# Patient Record
Sex: MALE | Race: WHITE | NOT HISPANIC OR LATINO | Employment: OTHER | URBAN - METROPOLITAN AREA
[De-identification: names, ages, dates, MRNs, and addresses within clinical notes are randomized per-mention and may not be internally consistent; named-entity substitution may affect disease eponyms.]

---

## 2021-06-01 ENCOUNTER — RECORDS - HEALTHEAST (OUTPATIENT)
Dept: ADMINISTRATIVE | Facility: CLINIC | Age: 66
End: 2021-06-01

## 2022-07-02 ENCOUNTER — APPOINTMENT (OUTPATIENT)
Dept: GENERAL RADIOLOGY | Facility: CLINIC | Age: 67
End: 2022-07-02
Attending: FAMILY MEDICINE
Payer: MEDICARE

## 2022-07-02 ENCOUNTER — APPOINTMENT (OUTPATIENT)
Dept: CT IMAGING | Facility: CLINIC | Age: 67
End: 2022-07-02
Attending: FAMILY MEDICINE
Payer: MEDICARE

## 2022-07-02 ENCOUNTER — HOSPITAL ENCOUNTER (EMERGENCY)
Facility: CLINIC | Age: 67
Discharge: HOME | End: 2022-07-02
Attending: FAMILY MEDICINE | Admitting: FAMILY MEDICINE
Payer: MEDICARE

## 2022-07-02 VITALS
RESPIRATION RATE: 20 BRPM | BODY MASS INDEX: 32.45 KG/M2 | TEMPERATURE: 98.3 F | OXYGEN SATURATION: 94 % | DIASTOLIC BLOOD PRESSURE: 63 MMHG | WEIGHT: 195 LBS | SYSTOLIC BLOOD PRESSURE: 124 MMHG | HEART RATE: 68 BPM

## 2022-07-02 DIAGNOSIS — K52.9 GASTROENTERITIS: ICD-10-CM

## 2022-07-02 DIAGNOSIS — I10 HYPERTENSION GOAL BP (BLOOD PRESSURE) < 140/90: ICD-10-CM

## 2022-07-02 DIAGNOSIS — I71.40 ABDOMINAL AORTIC ANEURYSM (AAA) WITHOUT RUPTURE (H): ICD-10-CM

## 2022-07-02 DIAGNOSIS — I72.3 ILIAC ARTERY ANEURYSM, LEFT (H): ICD-10-CM

## 2022-07-02 DIAGNOSIS — R59.1 LYMPHADENOPATHY: ICD-10-CM

## 2022-07-02 LAB
ALBUMIN SERPL-MCNC: 4.2 G/DL (ref 3.4–5)
ALBUMIN UR-MCNC: 100 MG/DL
ALP SERPL-CCNC: 55 U/L (ref 40–150)
ALT SERPL W P-5'-P-CCNC: 38 U/L (ref 0–70)
ANION GAP SERPL CALCULATED.3IONS-SCNC: 6 MMOL/L (ref 3–14)
APPEARANCE UR: CLEAR
AST SERPL W P-5'-P-CCNC: 27 U/L (ref 0–45)
BASOPHILS # BLD AUTO: 0.1 10E3/UL (ref 0–0.2)
BASOPHILS NFR BLD AUTO: 1 %
BILIRUB SERPL-MCNC: 0.5 MG/DL (ref 0.2–1.3)
BILIRUB UR QL STRIP: NEGATIVE
BUN SERPL-MCNC: 22 MG/DL (ref 7–30)
CALCIUM SERPL-MCNC: 10.1 MG/DL (ref 8.5–10.1)
CHLORIDE BLD-SCNC: 102 MMOL/L (ref 94–109)
CO2 SERPL-SCNC: 29 MMOL/L (ref 20–32)
COLOR UR AUTO: YELLOW
CREAT SERPL-MCNC: 0.94 MG/DL (ref 0.66–1.25)
EOSINOPHIL # BLD AUTO: 0 10E3/UL (ref 0–0.7)
EOSINOPHIL NFR BLD AUTO: 0 %
ERYTHROCYTE [DISTWIDTH] IN BLOOD BY AUTOMATED COUNT: 13.4 % (ref 10–15)
FLUAV RNA SPEC QL NAA+PROBE: NEGATIVE
FLUBV RNA RESP QL NAA+PROBE: NEGATIVE
GFR SERPL CREATININE-BSD FRML MDRD: 90 ML/MIN/1.73M2
GLUCOSE BLD-MCNC: 156 MG/DL (ref 70–99)
GLUCOSE UR STRIP-MCNC: NEGATIVE MG/DL
HCT VFR BLD AUTO: 53.2 % (ref 40–53)
HGB BLD-MCNC: 18 G/DL (ref 13.3–17.7)
HGB UR QL STRIP: ABNORMAL
HOLD SPECIMEN: NORMAL
IMM GRANULOCYTES # BLD: 0.1 10E3/UL
IMM GRANULOCYTES NFR BLD: 1 %
KETONES UR STRIP-MCNC: NEGATIVE MG/DL
LEUKOCYTE ESTERASE UR QL STRIP: NEGATIVE
LYMPHOCYTES # BLD AUTO: 1.8 10E3/UL (ref 0.8–5.3)
LYMPHOCYTES NFR BLD AUTO: 14 %
MCH RBC QN AUTO: 30.4 PG (ref 26.5–33)
MCHC RBC AUTO-ENTMCNC: 33.8 G/DL (ref 31.5–36.5)
MCV RBC AUTO: 90 FL (ref 78–100)
MONOCYTES # BLD AUTO: 0.5 10E3/UL (ref 0–1.3)
MONOCYTES NFR BLD AUTO: 4 %
MUCOUS THREADS #/AREA URNS LPF: PRESENT /LPF
NEUTROPHILS # BLD AUTO: 10.4 10E3/UL (ref 1.6–8.3)
NEUTROPHILS NFR BLD AUTO: 80 %
NITRATE UR QL: NEGATIVE
NRBC # BLD AUTO: 0 10E3/UL
NRBC BLD AUTO-RTO: 0 /100
PH UR STRIP: 6 [PH] (ref 5–7)
PLATELET # BLD AUTO: 361 10E3/UL (ref 150–450)
POTASSIUM BLD-SCNC: 3.7 MMOL/L (ref 3.4–5.3)
PROT SERPL-MCNC: 8.3 G/DL (ref 6.8–8.8)
RADIOLOGIST FLAGS: ABNORMAL
RBC # BLD AUTO: 5.93 10E6/UL (ref 4.4–5.9)
RBC URINE: >182 /HPF
SARS-COV-2 RNA RESP QL NAA+PROBE: NEGATIVE
SODIUM SERPL-SCNC: 137 MMOL/L (ref 133–144)
SP GR UR STRIP: 1.02 (ref 1–1.03)
UROBILINOGEN UR STRIP-MCNC: NORMAL MG/DL
WBC # BLD AUTO: 13 10E3/UL (ref 4–11)
WBC URINE: 2 /HPF

## 2022-07-02 PROCEDURE — 99285 EMERGENCY DEPT VISIT HI MDM: CPT | Mod: 25 | Performed by: FAMILY MEDICINE

## 2022-07-02 PROCEDURE — 85025 COMPLETE CBC W/AUTO DIFF WBC: CPT | Performed by: FAMILY MEDICINE

## 2022-07-02 PROCEDURE — 96374 THER/PROPH/DIAG INJ IV PUSH: CPT | Mod: 59 | Performed by: FAMILY MEDICINE

## 2022-07-02 PROCEDURE — 99285 EMERGENCY DEPT VISIT HI MDM: CPT | Performed by: FAMILY MEDICINE

## 2022-07-02 PROCEDURE — C9803 HOPD COVID-19 SPEC COLLECT: HCPCS | Performed by: FAMILY MEDICINE

## 2022-07-02 PROCEDURE — 71045 X-RAY EXAM CHEST 1 VIEW: CPT

## 2022-07-02 PROCEDURE — 80053 COMPREHEN METABOLIC PANEL: CPT | Performed by: FAMILY MEDICINE

## 2022-07-02 PROCEDURE — G1010 CDSM STANSON: HCPCS

## 2022-07-02 PROCEDURE — 81001 URINALYSIS AUTO W/SCOPE: CPT | Performed by: FAMILY MEDICINE

## 2022-07-02 PROCEDURE — 250N000011 HC RX IP 250 OP 636: Performed by: FAMILY MEDICINE

## 2022-07-02 PROCEDURE — 250N000009 HC RX 250: Performed by: FAMILY MEDICINE

## 2022-07-02 PROCEDURE — 87636 SARSCOV2 & INF A&B AMP PRB: CPT | Performed by: FAMILY MEDICINE

## 2022-07-02 PROCEDURE — 36415 COLL VENOUS BLD VENIPUNCTURE: CPT | Performed by: EMERGENCY MEDICINE

## 2022-07-02 PROCEDURE — 258N000003 HC RX IP 258 OP 636: Performed by: FAMILY MEDICINE

## 2022-07-02 PROCEDURE — 96361 HYDRATE IV INFUSION ADD-ON: CPT | Performed by: FAMILY MEDICINE

## 2022-07-02 RX ORDER — ONDANSETRON 4 MG/1
4 TABLET, ORALLY DISINTEGRATING ORAL EVERY 8 HOURS PRN
Qty: 10 TABLET | Refills: 0 | Status: SHIPPED | OUTPATIENT
Start: 2022-07-02 | End: 2022-08-03

## 2022-07-02 RX ORDER — AMLODIPINE BESYLATE 5 MG/1
5 TABLET ORAL DAILY
Qty: 30 TABLET | Refills: 0 | Status: SHIPPED | OUTPATIENT
Start: 2022-07-02

## 2022-07-02 RX ORDER — IOPAMIDOL 755 MG/ML
86 INJECTION, SOLUTION INTRAVASCULAR ONCE
Status: COMPLETED | OUTPATIENT
Start: 2022-07-02 | End: 2022-07-02

## 2022-07-02 RX ORDER — SODIUM CHLORIDE 9 MG/ML
1000 INJECTION, SOLUTION INTRAVENOUS CONTINUOUS
Status: DISCONTINUED | OUTPATIENT
Start: 2022-07-02 | End: 2022-07-02 | Stop reason: HOSPADM

## 2022-07-02 RX ORDER — ONDANSETRON 2 MG/ML
4 INJECTION INTRAMUSCULAR; INTRAVENOUS ONCE
Status: COMPLETED | OUTPATIENT
Start: 2022-07-02 | End: 2022-07-02

## 2022-07-02 RX ADMIN — IOPAMIDOL 86 ML: 755 INJECTION, SOLUTION INTRAVENOUS at 16:45

## 2022-07-02 RX ADMIN — SODIUM CHLORIDE 64 ML: 9 INJECTION, SOLUTION INTRAVENOUS at 16:44

## 2022-07-02 RX ADMIN — SODIUM CHLORIDE 1000 ML: 9 INJECTION, SOLUTION INTRAVENOUS at 15:03

## 2022-07-02 RX ADMIN — ONDANSETRON 4 MG: 2 INJECTION INTRAMUSCULAR; INTRAVENOUS at 15:04

## 2022-07-02 ASSESSMENT — ENCOUNTER SYMPTOMS
PALPITATIONS: 0
WHEEZING: 0
DIAPHORESIS: 1
FREQUENCY: 0
NAUSEA: 0
SORE THROAT: 0
BLOOD IN STOOL: 0
VOMITING: 0
FEVER: 1
ABDOMINAL PAIN: 0
SINUS PRESSURE: 0
DIARRHEA: 0
CHILLS: 1
HEADACHES: 0
COUGH: 1
DYSURIA: 0
CONSTIPATION: 0
SHORTNESS OF BREATH: 0

## 2022-07-02 NOTE — ED NOTES
I asked the Family, I believe two different son's If one or the other could stay with father, I explained that reji. With covid times that we can't have multiple family member's coming in and out of room and dept every few min. The son that stayed through the rest of visit seemed upset by my telling him the information. I told him I was sorry and that was policy. He stated people out front told him different, I told him they were incorrect.

## 2022-07-02 NOTE — ED TRIAGE NOTES
Vomiting, diarrhea, chills, cramping pain off and on     Triage Assessment     Row Name 07/02/22 6348       Triage Assessment (Adult)    Airway WDL WDL       Respiratory WDL    Respiratory WDL WDL       Skin Circulation/Temperature WDL    Skin Circulation/Temperature WDL WDL       Peripheral/Neurovascular WDL    Peripheral Neurovascular WDL WDL       Cognitive/Neuro/Behavioral WDL    Cognitive/Neuro/Behavioral WDL WDL

## 2022-07-02 NOTE — ED PROVIDER NOTES
History     Chief Complaint   Patient presents with     Vomiting     Fever     HPI  Nguyễn Salazar is a 66 year old male who presents of onset on Thursday of vomiting diarrhea chills some abdominal cramping.  Possible fever.  No dysuria urgency frequency.  Some history of inguinal pain since he had a hernia about a year ago.  Cough chronically.  No significant upper respiratory congestion.  Feels overall ill.  Decreased p.o. intake recently.      Allergies:  No Known Allergies    Problem List:    Patient Active Problem List    Diagnosis Date Noted     Mood disorder in conditions classified elsewhere 04/13/2015     Priority: Medium     Hyperlipidemia 01/04/2013     Priority: Medium     Lumbago 03/26/2012     Priority: Medium        Past Medical History:    No past medical history on file.    Past Surgical History:    No past surgical history on file.    Family History:    No family history on file.    Social History:  Marital Status:   [5]        Medications:    No current outpatient medications on file.        Review of Systems   Constitutional: Positive for chills, diaphoresis and fever.   HENT: Negative for ear pain, sinus pressure and sore throat.    Eyes: Negative for visual disturbance.   Respiratory: Positive for cough. Negative for shortness of breath and wheezing.    Cardiovascular: Negative for chest pain and palpitations.   Gastrointestinal: Negative for abdominal pain, blood in stool, constipation, diarrhea, nausea and vomiting.   Genitourinary: Negative for dysuria, frequency and urgency.   Skin: Negative for rash.   Neurological: Negative for headaches.   All other systems reviewed and are negative.      Physical Exam   BP: (!) 220/112  Pulse: 56  Temp: 98.3  F (36.8  C)  Resp: 20  Weight: 88.5 kg (195 lb)  SpO2: 97 %      Physical Exam  Constitutional:       General: He is in acute distress.      Appearance: He is not diaphoretic.   HENT:      Head: Atraumatic.   Eyes:       Conjunctiva/sclera: Conjunctivae normal.   Cardiovascular:      Rate and Rhythm: Normal rate and regular rhythm.      Heart sounds: No murmur heard.  Pulmonary:      Effort: Pulmonary effort is normal. No respiratory distress.      Breath sounds: Normal breath sounds. No stridor. No wheezing or rhonchi.   Abdominal:      General: Abdomen is flat. There is no distension.      Palpations: Abdomen is soft. There is no mass.      Tenderness: There is abdominal tenderness. There is no guarding.   Musculoskeletal:      Cervical back: Neck supple.      Right lower leg: No edema.      Left lower leg: No edema.   Skin:     Coloration: Skin is not pale.      Findings: No rash.   Neurological:      Mental Status: He is alert.      Motor: No weakness.         ED Course                 Procedures              Critical Care time:  none               Results for orders placed or performed during the hospital encounter of 07/02/22 (from the past 24 hour(s))   [place] Draw    Narrative    The following orders were created for panel order [place] Draw.  Procedure                               Abnormality         Status                     ---------                               -----------         ------                     Extra Blue Top Tube[[id number]]                              Final result               Extra Red Top Tube[[id number]]                               Final result               Extra Green Top (Lithium...[[id number]]                      Final result               Extra Purple Top Tube[[id number]]                            Final result                 Please view results for these tests on the individual orders.   Extra Blue Top Tube   Result Value Ref Range    Hold Specimen JIC    Extra Red Top Tube   Result Value Ref Range    Hold Specimen JIC    Extra Green Top (Lithium Heparin) Tube   Result Value Ref Range    Hold Specimen JIC    Extra Purple Top Tube   Result Value Ref Range    Hold Specimen JIC    CBC with  platelets differential    Narrative    The following orders were created for panel order CBC with platelets differential.  Procedure                               Abnormality         Status                     ---------                               -----------         ------                     CBC with platelets and d...[[id number]]  Abnormal            Final result                 Please view results for these tests on the individual orders.   Comprehensive metabolic panel   Result Value Ref Range    Sodium 137 133 - 144 mmol/L    Potassium 3.7 3.4 - 5.3 mmol/L    Chloride 102 94 - 109 mmol/L    Carbon Dioxide (CO2) 29 20 - 32 mmol/L    Anion Gap 6 3 - 14 mmol/L    Urea Nitrogen 22 7 - 30 mg/dL    Creatinine 0.94 0.66 - 1.25 mg/dL    Calcium 10.1 8.5 - 10.1 mg/dL    Glucose 156 (H) 70 - 99 mg/dL    Alkaline Phosphatase 55 40 - 150 U/L    AST 27 0 - 45 U/L    ALT 38 0 - 70 U/L    Protein Total 8.3 6.8 - 8.8 g/dL    Albumin 4.2 3.4 - 5.0 g/dL    Bilirubin Total 0.5 0.2 - 1.3 mg/dL    GFR Estimate 90 >60 mL/min/1.73m2   CBC with platelets and differential   Result Value Ref Range    WBC Count 13.0 (H) 4.0 - 11.0 10e3/uL    RBC Count 5.93 (H) 4.40 - 5.90 10e6/uL    Hemoglobin 18.0 (H) 13.3 - 17.7 g/dL    Hematocrit 53.2 (H) 40.0 - 53.0 %    MCV 90 78 - 100 fL    MCH 30.4 26.5 - 33.0 pg    MCHC 33.8 31.5 - 36.5 g/dL    RDW 13.4 10.0 - 15.0 %    Platelet Count 361 150 - 450 10e3/uL    % Neutrophils 80 %    % Lymphocytes 14 %    % Monocytes 4 %    % Eosinophils 0 %    % Basophils 1 %    % Immature Granulocytes 1 %    NRBCs per 100 WBC 0 <1 /100    Absolute Neutrophils 10.4 (H) 1.6 - 8.3 10e3/uL    Absolute Lymphocytes 1.8 0.8 - 5.3 10e3/uL    Absolute Monocytes 0.5 0.0 - 1.3 10e3/uL    Absolute Eosinophils 0.0 0.0 - 0.7 10e3/uL    Absolute Basophils 0.1 0.0 - 0.2 10e3/uL    Absolute Immature Granulocytes 0.1 <=0.4 10e3/uL    Absolute NRBCs 0.0 10e3/uL   Symptomatic; Unknown Influenza A/B & SARS-CoV2 (COVID-19) Virus PCR  Multiplex Nasopharyngeal    Specimen: Nasopharyngeal; Swab   Result Value Ref Range    Influenza A PCR Negative Negative    Influenza B PCR Negative Negative    SARS CoV2 PCR Negative Negative    Narrative    Testing was performed using the kg SARS-CoV-2 & Influenza A/B Assay on the kg [employer] System. This test should be ordered for the detection of SARS-CoV-2 and influenza viruses in individuals who meet clinical and/or epidemiological criteria. Test performance is unknown in asymptomatic patients. This test is for in vitro diagnostic use under the FDA EUA for laboratories certified under CLIA to perform moderate and/or high complexity testing. This test has not been FDA cleared or approved. A negative result does not rule out the presence of PCR inhibitors in the specimen or target RNA in concentration below the limit of detection for the assay. If only one viral target is positive but coinfection with multiple targets is suspected, the sample should be re-tested with another FDA cleared, approved or authorized test, if coinfection would change clinical management. [facility] are certified under the Clinical Laboratory Improvement Amendments of 1988 (CLIA-88) as  qualified to perform moderate and/or high complexity laboratory testing.   CBC with platelets, differential *Canceled*    Narrative    The following orders were created for panel order CBC with platelets, differential.  Procedure                               Abnormality         Status                     ---------                               -----------         ------                       Please view results for these tests on the individual orders.   XR Chest Port 1 View    Narrative    EXAM: XR CHEST PORT 1 VIEW  LOCATION: [facility]  DATE/TIME: 7/2/2022 3:02 PM    INDICATION: cough, fever  COMPARISON: None.      Impression    IMPRESSION: Lungs are clear. Heart and pulmonary vascularity are normal.  No signs of acute disease. Postsurgical changes in the right humerus.   UA reflex to Microscopic   Result Value Ref Range    Color Urine Yellow Colorless, Straw, Light Yellow, Yellow    Appearance Urine Clear Clear    Glucose Urine Negative Negative mg/dL    Bilirubin Urine Negative Negative    Ketones Urine Negative Negative mg/dL    Specific Gravity Urine 1.019 1.003 - 1.035    Blood Urine Moderate (A) Negative    pH Urine 6.0 5.0 - 7.0    Protein Albumin Urine 100  (A) Negative mg/dL    Urobilinogen Urine Normal Normal, 2.0 mg/dL    Nitrite Urine Negative Negative    Leukocyte Esterase Urine Negative Negative    RBC Urine >182 (H) <=2 /HPF    WBC Urine 2 <=5 /HPF    Mucus Urine Present (A) None Seen /LPF   CT Abdomen Pelvis w Contrast   Result Value Ref Range    Radiologist flags (Urgent)     Narrative    EXAM: CT ABDOMEN PELVIS W CONTRAST  LOCATION: [facility]  DATE/TIME: 7/2/2022 4:44 PM    INDICATION: abd pain generalized. eval for SBO  COMPARISON: None.  TECHNIQUE: CT scan of the abdomen and pelvis was performed following injection of IV contrast. Multiplanar reformats were obtained. Dose reduction techniques were used.  CONTRAST: 86 mL Isovue 370    FINDINGS:   LOWER CHEST: Normal.    HEPATOBILIARY: Diffuse fatty infiltration.    PANCREAS: Normal.    SPLEEN: Normal.    ADRENAL GLANDS: There are heterogeneous adrenal nodules bilaterally, at least 2 on each side. Largest one is on the right measuring 2.8 x 2.7 cm.    KIDNEYS/BLADDER: Ptotic right kidney. There is a mild distention of the left intrarenal collecting system. No perinephric stranding or calculi. There are a few tiny simple cysts which need no follow-up. Bladder is unremarkable.    BOWEL: Redundant colon. No free fluid or inflammatory changes. Terminal ileum is normal. Appendix not seen.    LYMPH NODES: Normal.    VASCULATURE: There is a fusiform infrarenal abdominal aortic aneurysm measuring 7.4 cm in length. This  measures 5.2 in AP x 4.8 cm in transverse dimension. There is also fusiform left common iliac artery aneurysm extending to the bifurcation measuring 3   x 3.1 cm. Mesenteric vessels are well opacified.    PELVIC ORGANS: Normal.    MUSCULOSKELETAL: No suspicious lesions.      Impression    IMPRESSION:   1.  There is a fusiform infrarenal abdominal aortic aneurysm measuring 5.2 x 4.8 cm.  2.  There is a fusiform left common iliac artery aneurysm measuring 3 x 2.1 cm.  3.  There are bilateral heterogeneous adrenal nodules, largest on the right measures at 2.8 cm. Assuming patient does not have a known underlying malignancy, follow-up limited adrenal CT suggested to ascertain if these reflect nonfunctioning adenomas.  4.  Marked hepatic steatosis.  5.  There is mild distention of the left intrarenal collecting system without any perinephric stranding or obstructing lesions visible.  6.  No abnormalities are seen to explain pain.    [Access Center:   1. Patient needs a vascular surgery/IR follow-up for the left common iliac and infrarenal abdominal aortic aneurysm, described above.]  2. Nonemergent follow-up adrenal CT also suggested for bilateral adrenal nodules.    This report will be copied to the [facility] to ensure a provider acknowledges the finding. Access Center is available Monday through Friday 8am-3:30 pm.          Medications   sodium chloride 0.9% infusion (1,000 mLs Intravenous New Bag 7/2/22 1503)   ondansetron (ZOFRAN) injection 4 mg (4 mg Intravenous Given 7/2/22 1504)       Assessments & Plan (with Medical Decision Making)     MDM: Nguyễn Salazar is a 66 year old male who presents with febrile type illness onset Thursday with nausea vomiting diarrhea chronic cough.  Relatively benign abdomen.  No serious other findings.  Plan for more broad-based evaluation for febrile illness.  Hemodynamically stable.  Hypertensive on initial arrival systolics come down to the 180s.  Tells me he has had  minimal p.o. intake in last day    On reexam after initial labs and mildly elevated white blood cell count, discussed the potential for CT imaging.  He has tenderness and variable history of abdominal pain.  Likely gastroenteritis related but symptoms appear to be worse than typical gastroenteritis and therefore pursued CT of the abdomen pelvis.  We discussed the findings which are multiple incidental findings.  Still likely gastroenteritis as the cause of his presentation but is CT of his abdominal aorta demonstrates dilatation of 5.3 cm and his iliac on the left side is also dilated.  This does not appear to be the cause of his pain.   He will need vascular reevaluation.   d/w Dr. De in vascular surgery. ok for discharge and follow-up vascular.   We also discussed the findings related to lymphadenopathy.  I recommended that he follow-up with primary provider.  Consider adrenal CT.  Consider other evaluation for underlying malignancy although no current findings.    Given the very high systolic blood pressures on presentation that did ultimately improve and the current AAA size I recommended that we at least initiate low-dose blood pressure management after his current gastroenteritis-like symptoms resolved.  He agrees to this.  I have sent [facility] to pharmacy as well as Zofran.  Discussed precautions for return.    I have reviewed the nursing notes.    I have reviewed the findings, diagnosis, plan and need for follow up with the patient.       New Prescriptions    No medications on file       Final diagnoses:   Gastroenteritis - take zofran as needed for nausea. unclear cause.  stay huydrated with fluid 64 oz per day.   Abdominal aortic aneurysm (AAA) without rupture (H) - discussed with vascular surgery. follow-up - consult written   Iliac artery aneurysm, left (H)   Hypertension goal BP (blood pressure) < 140/90 - blood pressure has beedn elevated and given AAA, I'm recommending we start BP med and  follow-up clinic.  wait to start until diarrhea and vomting stop.   Lymphadenopathy - found on CT abdomen - unclear cause.  also the adrenal gland has atypical frindings.  follw-up for directed CT adrenal       7/2/2022   [facility] EMERGENCY DEPT     Pool Prieto MD  07/02/22 5844

## 2022-07-06 DIAGNOSIS — I71.40 AAA (ABDOMINAL AORTIC ANEURYSM) (H): Primary | ICD-10-CM

## 2022-07-14 ENCOUNTER — OFFICE VISIT (OUTPATIENT)
Dept: VASCULAR SURGERY | Facility: CLINIC | Age: 67
End: 2022-07-14
Attending: FAMILY MEDICINE
Payer: MEDICARE

## 2022-07-14 ENCOUNTER — ANCILLARY PROCEDURE (OUTPATIENT)
Dept: VASCULAR ULTRASOUND | Facility: CLINIC | Age: 67
End: 2022-07-14
Attending: SURGERY
Payer: MEDICARE

## 2022-07-14 VITALS — SYSTOLIC BLOOD PRESSURE: 110 MMHG | DIASTOLIC BLOOD PRESSURE: 70 MMHG | RESPIRATION RATE: 16 BRPM | HEART RATE: 70 BPM

## 2022-07-14 DIAGNOSIS — I71.40 AAA (ABDOMINAL AORTIC ANEURYSM) (H): ICD-10-CM

## 2022-07-14 DIAGNOSIS — I72.3 ILIAC ARTERY ANEURYSM, LEFT (H): ICD-10-CM

## 2022-07-14 DIAGNOSIS — I71.40 ABDOMINAL AORTIC ANEURYSM (AAA) WITHOUT RUPTURE (H): ICD-10-CM

## 2022-07-14 DIAGNOSIS — R09.89 OTHER SPECIFIED SYMPTOMS AND SIGNS INVOLVING THE CIRCULATORY AND RESPIRATORY SYSTEMS: ICD-10-CM

## 2022-07-14 PROCEDURE — 93978 VASCULAR STUDY: CPT | Mod: 26 | Performed by: SURGERY

## 2022-07-14 PROCEDURE — 93978 VASCULAR STUDY: CPT

## 2022-07-14 PROCEDURE — 99204 OFFICE O/P NEW MOD 45 MIN: CPT | Performed by: SURGERY

## 2022-07-14 RX ORDER — ATORVASTATIN CALCIUM 80 MG/1
80 TABLET, FILM COATED ORAL DAILY
Qty: 90 TABLET | Refills: 3 | Status: SHIPPED | OUTPATIENT
Start: 2022-07-14 | End: 2022-09-01
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
## 2022-07-14 NOTE — PATIENT INSTRUCTIONS
PLEASE CALL [phone] TO ESTABLISH CARE WITH A PRIMARY CARE PROVIDER      Understanding Abdominal Aortic Aneurysm  You may have been told that you have an aneurysm . This is when a weakened part of a blood vessel expands like a balloon. An aneurysm in the main blood vessel in your stomach area is called an abdominal aortic aneurysm (AAA).    What is AAA?  Front view of abdominal aorta with aneurysms. Dotted line shows normal width of aorta.  An aneurysm happens when a weakened part of the aorta wall stretches and expands.  The aorta is the large artery that carries blood from the heart to the rest of the body. With AAA, part of the aorta weakens and expands. If an aneurysm gets large enough, it may burst. This is very serious, and usually fatal.        How is an aneurysm found?  AAA usually causes no symptoms. It's often found when tests (such as an X-ray, MRI, or CT scan) are done for an unrelated problem. Or your healthcare provider may find it while feeling your stomach during a routine exam.      Who develops AAA?  These things increase your chances of having AAA:    AAA runs in your family  Your age. AAA is more likely as you get older.  Men are more likely than women to have AAA  Smoking  High blood pressure  High cholesterol level. This is a buildup of fat and other materials in the blood.  Injury, such as a car accident    What can be done?  Surgery can be done to remove an aneurysm. Your healthcare provider will weigh the chances that the aneurysm will burst against the risks of treatment. Because a small and slow growing aneurysm is not likely to burst, it may be watched for a while. When it reaches a certain size, you may have surgery to replace that section of your aorta.        0961-5076 [employer]. [address] [place] [address], [place] [address]. All rights reserved. This information is not intended as a substitute for professional medical care. Always follow your healthcare  professional's instructions.        Computed Tomography Angiography (CTA)    Computed tomography angiography (CTA) is an imaging test. It uses X-rays and computer technology to make detailed pictures of your arteries. Before the test, an X-ray dye (contrast medium) is injected into your vein. The dye makes it easier to see your blood vessels on the X-ray. Pictures are then taken with the CT scanner. A computer turns the images into 2-D and 3-D pictures.      Computed tomography angiogram of carotid arteries.  CTA can make 3D images, such as the carotid arteries shown here.    Why CTA is done  CTA may be used to:    Check arteries in your belly, neck, lungs, pelvis, kidneys, or brain.  Look for a ballooning of the blood vessel wall (aneurysm) or a tear (dissection).  Check if a tube (stent) used to keep an artery open is working well.  Find damage to your arteries due to injuries.  Collect details on blood vessels that supply blood to tumors.    Getting ready for your test  Tell your healthcare provider if you:    Have diabetes  Have kidney disease  Are allergic to X-ray dye or other medicines  Are pregnant or think you may be pregnant  Are taking any medicines, herbs, or supplements, including prescription medicines, illegal drugs, and over-the-counter medicines such as aspirin or ibuprofen    Follow any directions you are given for not eating or drinking before the CTA. Follow any other instructions from your healthcare provider.      During your test  You will be asked to remove any hair clips, jewelry, false teeth, or other metal items that could show up on the X-ray.  You will lie down on the scanning table. An IV line will be put in a vein in your arm or hand.  The scanning table will be properly placed. The part of your body being checked will be inside the doughnut-shaped CT scanner.  One image may be taken first to be sure you are in the proper position for the test.  The IV will be hooked up to an automatic  injection machine. This controls how often and how fast the X-ray dye is injected. The injection may continue during part of the exam.  The dye will be put into your vein through the IV line. You may feel warmth through your body when the dye is injected.  You can t move while the X-rays are being taken. Pillows and foam pads may be used to help you stay still. You will be told to hold your breath for 10 to 25 seconds at a time.  A single scan may take several minutes. You may need more than one scan.    After your test  Drink plenty of fluids to help flush the X-ray dye from your body.  You may eat as soon as you want to.    Possible risks  All procedures have some risks. A CTA has some possible risks. These include:  Problems due to the X-ray dye, such as an allergic reaction or kidney damage  Skin damage from leaking X-ray dye near where the IV was put in      5801-0329 [employer]. [address] [place] [address], [place] [id number]. All rights reserved. This information is not intended as a substitute for professional medical care. Always follow your healthcare professional's instructions.

## 2022-07-14 NOTE — NURSING NOTE
[facility]        Patient is here for a consult to discuss Abdominal aortic aneurysm (AAA). Pt went to ED for ABD pain and nausea which has resolved. AAA noted on CTA.     Pt is currently taking no meds that would impact our treatment plan.    /70   Pulse 70   Resp 16     The provider has been notified that the patient has no concerns.     Questions patient would like addressed today are: N/A.    Refills are needed: N/A    Has homecare services and agency name:  Jesusita León RN

## 2022-07-20 ENCOUNTER — TELEPHONE (OUTPATIENT)
Dept: VASCULAR SURGERY | Facility: CLINIC | Age: 67
End: 2022-07-20

## 2022-07-20 NOTE — TELEPHONE ENCOUNTER
Left message for patient that his ultrasounds were rescheduled by [facility] scheduling to 8/16/22. CTA scan still scheduled for 7/25/22. (Ultrasounds were not originally scheduled at vascular clinic due to patient living far away and wanting to group appointments)  Asked him to call back to reschedule ultrasounds for the first week of August at the vascular clinic if he is available.

## 2022-07-25 ENCOUNTER — HOSPITAL ENCOUNTER (OUTPATIENT)
Dept: CT IMAGING | Facility: CLINIC | Age: 67
Discharge: HOME | End: 2022-07-25
Attending: SURGERY | Admitting: SURGERY
Payer: MEDICARE

## 2022-07-25 DIAGNOSIS — R09.89 OTHER SPECIFIED SYMPTOMS AND SIGNS INVOLVING THE CIRCULATORY AND RESPIRATORY SYSTEMS: ICD-10-CM

## 2022-07-25 DIAGNOSIS — I72.3 ILIAC ARTERY ANEURYSM, LEFT (H): ICD-10-CM

## 2022-07-25 DIAGNOSIS — I71.40 ABDOMINAL AORTIC ANEURYSM (AAA) WITHOUT RUPTURE (H): ICD-10-CM

## 2022-07-25 PROCEDURE — 250N000011 HC RX IP 250 OP 636: Performed by: SURGERY

## 2022-07-25 PROCEDURE — G1010 CDSM STANSON: HCPCS

## 2022-07-25 RX ORDER — IOPAMIDOL 755 MG/ML
100 INJECTION, SOLUTION INTRAVASCULAR ONCE
Status: COMPLETED | OUTPATIENT
Start: 2022-07-25 | End: 2022-07-25

## 2022-07-25 RX ADMIN — IOPAMIDOL 100 ML: 755 INJECTION, SOLUTION INTRAVENOUS at 16:17
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
## 2022-08-03 ENCOUNTER — OFFICE VISIT (OUTPATIENT)
Dept: SURGERY | Facility: CLINIC | Age: 67
End: 2022-08-03
Attending: SURGERY
Payer: MEDICARE

## 2022-08-03 VITALS — WEIGHT: 189 LBS | SYSTOLIC BLOOD PRESSURE: 142 MMHG | BODY MASS INDEX: 31.45 KG/M2 | DIASTOLIC BLOOD PRESSURE: 80 MMHG

## 2022-08-03 DIAGNOSIS — E27.8 ADRENAL MASS 1 CM TO 4 CM IN DIAMETER (H): Primary | ICD-10-CM

## 2022-08-03 DIAGNOSIS — R09.89 OTHER SPECIFIED SYMPTOMS AND SIGNS INVOLVING THE CIRCULATORY AND RESPIRATORY SYSTEMS: ICD-10-CM

## 2022-08-03 DIAGNOSIS — I71.40 ABDOMINAL AORTIC ANEURYSM (AAA) WITHOUT RUPTURE (H): ICD-10-CM

## 2022-08-03 DIAGNOSIS — I72.3 ILIAC ARTERY ANEURYSM, LEFT (H): ICD-10-CM

## 2022-08-03 PROCEDURE — 99204 OFFICE O/P NEW MOD 45 MIN: CPT | Performed by: SURGERY
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
## 2022-08-03 NOTE — PATIENT INSTRUCTIONS
Understanding Secondary Adrenal Insufficiency    Adrenal insufficiency occurs when the adrenal glands don t work right. They don t make enough of the hormone cortisol.    You have 2 adrenal glands. They are just above the kidneys. They work with the hypothalamus and pituitary glands in the brain to make cortisol and several other hormones. These hormones help break down fats, proteins, and carbohydrates in your body. They also control blood pressure and affect how your immune system works.    There are 3 types of adrenal insufficiency. They are:    Primary. This type is known as [place] disease. It occurs when the adrenal glands are damaged. They don t make enough of the hormones cortisol and aldosterone. This type is rare. It may happen at any age.  Secondary. In this type, the pituitary gland doesn t make enough of the hormone ACTH (adrenocorticotropin). As a result, the adrenal glands don t make enough cortisol.  Tertiary. This type starts in the hypothalamus. This part of the brain doesn t make a hormone that causes the pituitary gland to make ACTH. So the adrenal glands don t make enough cortisol.  What causes secondary adrenal insufficiency?  A lack of the hormone ACTH leads to this type of adrenal insufficiency. It often stems from a problem with the pituitary gland, such as:    A tumor  An infection or disease like tuberculosis  An injury to the gland  Rare genetic problems that change how much ACTH is made  It can also happen if you must take certain steroids for a long time. People with health problems such as asthma or rheumatoid arthritis tend to be more likely to get it.      Symptoms of secondary adrenal insufficiency  Symptoms may occur a bit differently in each person. They may come on slowly. They may be worse under physical stress. You may have:    Tiredness (fatigue)  Weakness  Weight loss  Nausea  Vomiting  Diarrhea  These symptoms may look like other health problems. Always see your healthcare  provider for a diagnosis.      Treatment for secondary adrenal insufficiency  You will need to take hormones to replace those that your adrenal glands are not making. That mainly means cortisol. You will have to take medicines such as hydrocortisone or prednisone.    Take your medicine exactly as told. You may have to take these medicines a couple times a day. You may also have to change your dose if you become sick, are injured, or face some other physical stress.    You should also carry a medical alert card or tag at all times. This can make sure you get the right care if there is an emergency. When traveling, always carry an emergency kit with a shot of cortisol.      Possible complications of secondary adrenal insufficiency  The main complication is adrenal crisis. This can be life-threatening. It can happen if your body is stressed, such as from an illness, surgery, or dehydration. Symptoms start quickly. Seek care right away if you have:    Severe belly pain  Vomiting and diarrhea  Weakness  Confusion  Loss of consciousness    When to call your healthcare provider  Anything that stresses your body can affect how much medicine you need. Call your healthcare provider if:    You have any kind of sickness, especially a fever or diarrhea  You become pregnant  You need surgery  Get help right away if you have symptoms of adrenal crisis.        1769-8727 [employer] All rights reserved. This information is not intended as a substitute for professional medical care. Always follow your healthcare professional's instructions.

## 2022-08-05 RX ORDER — AMLODIPINE BESYLATE 5 MG/1
TABLET ORAL
Qty: 30 TABLET | Refills: 0 | OUTPATIENT
Start: 2022-08-05

## 2022-08-22 NOTE — TELEPHONE ENCOUNTER
Multiple attempts to call the pt and messages left with the pts son to schedule imaging for Dr. Fontenot.  Letter sent today with contact information to schedule.  [phone]